# Patient Record
Sex: FEMALE | Race: WHITE | Employment: FULL TIME | URBAN - METROPOLITAN AREA
[De-identification: names, ages, dates, MRNs, and addresses within clinical notes are randomized per-mention and may not be internally consistent; named-entity substitution may affect disease eponyms.]

---

## 2024-02-01 ENCOUNTER — OFFICE VISIT (OUTPATIENT)
Dept: FAMILY MEDICINE CLINIC | Age: 39
End: 2024-02-01

## 2024-02-01 VITALS
DIASTOLIC BLOOD PRESSURE: 93 MMHG | HEART RATE: 72 BPM | HEIGHT: 65 IN | TEMPERATURE: 101.3 F | OXYGEN SATURATION: 98 % | WEIGHT: 217.59 LBS | BODY MASS INDEX: 36.25 KG/M2 | SYSTOLIC BLOOD PRESSURE: 140 MMHG | RESPIRATION RATE: 18 BRPM

## 2024-02-01 DIAGNOSIS — Z32.02 PREGNANCY TEST NEGATIVE: ICD-10-CM

## 2024-02-01 DIAGNOSIS — R05.9 COUGH, UNSPECIFIED TYPE: ICD-10-CM

## 2024-02-01 DIAGNOSIS — Z59.89 UNINSURED: ICD-10-CM

## 2024-02-01 DIAGNOSIS — R50.81 FEVER IN OTHER DISEASES: ICD-10-CM

## 2024-02-01 DIAGNOSIS — J02.9 THROAT INFECTION: Primary | ICD-10-CM

## 2024-02-01 PROBLEM — K80.20 GALLSTONES: Status: ACTIVE | Noted: 2023-03-16

## 2024-02-01 LAB
HCG, PREGNANCY, URINE, POC: ABNORMAL
VALID INTERNAL CONTROL, POC: NEGATIVE

## 2024-02-01 PROCEDURE — 99203 OFFICE O/P NEW LOW 30 MIN: CPT | Performed by: NURSE PRACTITIONER

## 2024-02-01 RX ORDER — AMOXICILLIN 500 MG/1
500 CAPSULE ORAL 2 TIMES DAILY
Qty: 20 CAPSULE | Refills: 0 | Status: SHIPPED | OUTPATIENT
Start: 2024-02-01 | End: 2024-02-11

## 2024-02-01 RX ORDER — DEXTROMETHORPHAN HYDROBROMIDE AND PROMETHAZINE HYDROCHLORIDE 15; 6.25 MG/5ML; MG/5ML
5 SYRUP ORAL 4 TIMES DAILY PRN
Qty: 118 ML | Refills: 0 | Status: SHIPPED | OUTPATIENT
Start: 2024-02-01 | End: 2024-02-08

## 2024-02-01 RX ORDER — NORGESTIMATE AND ETHINYL ESTRADIOL 0.25-0.035
1 KIT ORAL DAILY
COMMUNITY
Start: 2024-01-14

## 2024-02-01 RX ORDER — ACETAMINOPHEN 500 MG
500 TABLET ORAL EVERY 6 HOURS PRN
Qty: 120 TABLET | Refills: 3 | Status: SHIPPED | OUTPATIENT
Start: 2024-02-01

## 2024-02-01 SDOH — ECONOMIC STABILITY - INCOME SECURITY: OTHER PROBLEMS RELATED TO HOUSING AND ECONOMIC CIRCUMSTANCES: Z59.89

## 2024-02-01 ASSESSMENT — ENCOUNTER SYMPTOMS
COUGH: 1
SINUS PAIN: 1
RHINORRHEA: 1
SORE THROAT: 1
CHEST TIGHTNESS: 0
SHORTNESS OF BREATH: 0
SINUS PRESSURE: 1
WHEEZING: 0

## 2024-02-01 ASSESSMENT — LIFESTYLE VARIABLES
HOW MANY STANDARD DRINKS CONTAINING ALCOHOL DO YOU HAVE ON A TYPICAL DAY: PATIENT DOES NOT DRINK
HOW OFTEN DO YOU HAVE A DRINK CONTAINING ALCOHOL: NEVER

## 2024-02-01 NOTE — PROGRESS NOTES
Ayala Ramos (:  1985) is a 38 y.o. female,New patient, here for evaluation of the following chief complaint(s):  Congestion and Cough (Patient seen for congestion and cough, fever 5 days ago. On Tuesday went to [facility] tested negative for COVID. Patient treated with OTC medications with no help. Patient denies pregnancy, said her period was last month.)        SUBJECTIVE/OBJECTIVE:    HPI:  Ms. Ramos, 38 y.o female patient presents today in [facility] at [address] [place] [address] for congestion, fever, cough, and denies body ache. Patient has tested negative for COVID on Tuesday at [facility]. She still runs fever. Denies treatment from [facility]. She admits treating herself with OTC medications with no good result.    BP (!) 140/93 (Site: Right Upper Arm, Position: Sitting, Cuff Size: Large Adult)   Pulse 72   Temp (!) 101.3 °F (38.5 °C) (Temporal)   Resp 18   Ht 1.65 m (5' 4.96\")   Wt 98.7 kg (217 lb 9.5 oz)   SpO2 98%   BMI 36.25 kg/m²     No results found for: \"CBC\", \"CMP\", \"LIPIDPAN\", \"TSH\", \"HBA1C\"     No Known Allergies    Current Outpatient Medications   Medication Sig Dispense Refill    norgestimate-ethinyl estradiol (ORTHO-CYCLEN) 0.25-35 MG-MCG per tablet Take 1 tablet by mouth daily      amoxicillin (AMOXIL) 500 MG capsule Take 1 capsule by mouth 2 times daily for 10 days 20 capsule 0    promethazine-dextromethorphan (PROMETHAZINE-DM) 6.25-15 MG/5ML syrup Take 5 mLs by mouth 4 times daily as needed for Cough 118 mL 0    acetaminophen (TYLENOL) 500 MG tablet Take 1 tablet by mouth every 6 hours as needed for Pain 120 tablet 3     No current facility-administered medications for this visit.       No past medical history on file.    No past surgical history on file.    Review of Systems   Constitutional:  Positive for fever.   HENT:  Positive for congestion, rhinorrhea, sinus pressure, sinus pain, sneezing and sore throat.    Respiratory:

## 2024-02-07 ENCOUNTER — OFFICE VISIT (OUTPATIENT)
Dept: FAMILY MEDICINE CLINIC | Age: 39
End: 2024-02-07

## 2024-02-07 VITALS — WEIGHT: 213.19 LBS | BODY MASS INDEX: 35.52 KG/M2

## 2024-02-07 DIAGNOSIS — T36.95XA ANTIBIOTIC-INDUCED YEAST INFECTION: Primary | ICD-10-CM

## 2024-02-07 DIAGNOSIS — B37.9 ANTIBIOTIC-INDUCED YEAST INFECTION: Primary | ICD-10-CM

## 2024-02-07 RX ORDER — FLUCONAZOLE 150 MG/1
150 TABLET ORAL DAILY
Qty: 1 TABLET | Refills: 0 | Status: SHIPPED | OUTPATIENT
Start: 2024-02-07 | End: 2024-02-08

## 2024-02-07 NOTE — PROGRESS NOTES
Ayala Ramos (:  1985) is a 38 y.o. female,Established patient, here for evaluation of the following chief complaint(s):  No chief complaint on file.        SUBJECTIVE/OBJECTIVE:    HPI:  Patient was seen last week for throat infection. Antibiotic given. Patient has yeast infection due to the antibiotic treatment.    This is not a full visit.     Wt 96.7 kg (213 lb 3 oz)   BMI 35.52 kg/m²     No results found for: \"CBC\", \"CMP\", \"LIPIDPAN\", \"TSH\", \"HBA1C\"     Allergies   Allergen Reactions    Azithromycin Diarrhea    Penicillin V Hives    Codeine Itching       Current Outpatient Medications   Medication Sig Dispense Refill    fluconazole (DIFLUCAN) 150 MG tablet Take 1 tablet by mouth daily for 1 day 1 tablet 0    norgestimate-ethinyl estradiol (ORTHO-CYCLEN) 0.25-35 MG-MCG per tablet Take 1 tablet by mouth daily      amoxicillin (AMOXIL) 500 MG capsule Take 1 capsule by mouth 2 times daily for 10 days 20 capsule 0    promethazine-dextromethorphan (PROMETHAZINE-DM) 6.25-15 MG/5ML syrup Take 5 mLs by mouth 4 times daily as needed for Cough 118 mL 0    acetaminophen (TYLENOL) 500 MG tablet Take 1 tablet by mouth every 6 hours as needed for Pain 120 tablet 3     No current facility-administered medications for this visit.       No past medical history on file.    No past surgical history on file.    Review of Systems    Physical Exam      ASSESSMENT/PLAN:  1. Antibiotic-induced yeast infection  -     fluconazole (DIFLUCAN) 150 MG tablet; Take 1 tablet by mouth daily for 1 day, Disp-1 tablet, R-0Normal      Return if symptoms worsen or fail to improve.            An electronic signature was used to authenticate this note.    --VIKKI GROSSMAN, APRN - CNP